# Patient Record
Sex: MALE | Race: WHITE
[De-identification: names, ages, dates, MRNs, and addresses within clinical notes are randomized per-mention and may not be internally consistent; named-entity substitution may affect disease eponyms.]

---

## 2020-07-15 ENCOUNTER — HOSPITAL ENCOUNTER (OUTPATIENT)
Dept: HOSPITAL 84 - D.US | Age: 30
Discharge: HOME | End: 2020-07-15
Attending: FAMILY MEDICINE
Payer: SELF-PAY

## 2020-07-15 DIAGNOSIS — R94.5: Primary | ICD-10-CM

## 2020-07-15 DIAGNOSIS — F10.10: ICD-10-CM

## 2021-05-05 ENCOUNTER — HOSPITAL ENCOUNTER (OUTPATIENT)
Dept: HOSPITAL 84 - D.OPS | Age: 31
Discharge: HOME | End: 2021-05-05
Attending: SURGERY
Payer: COMMERCIAL

## 2021-05-05 VITALS
HEIGHT: 72 IN | HEIGHT: 72 IN | BODY MASS INDEX: 33.59 KG/M2 | WEIGHT: 248 LBS | WEIGHT: 248 LBS | BODY MASS INDEX: 33.59 KG/M2

## 2021-05-05 DIAGNOSIS — F41.0: ICD-10-CM

## 2021-05-05 DIAGNOSIS — K74.60: Primary | ICD-10-CM

## 2021-05-05 DIAGNOSIS — R16.0: ICD-10-CM

## 2021-05-05 DIAGNOSIS — K81.9: ICD-10-CM

## 2021-05-05 LAB
ANION GAP SERPL CALC-SCNC: 13.7 MMOL/L (ref 8–16)
BASOPHILS NFR BLD AUTO: 0.6 % (ref 0–2)
BUN SERPL-MCNC: 16 MG/DL (ref 7–18)
CALCIUM SERPL-MCNC: 9.1 MG/DL (ref 8.5–10.1)
CHLORIDE SERPL-SCNC: 102 MMOL/L (ref 98–107)
CO2 SERPL-SCNC: 27.2 MMOL/L (ref 21–32)
CREAT SERPL-MCNC: 1 MG/DL (ref 0.6–1.3)
EOSINOPHIL NFR BLD: 2.4 % (ref 0–7)
ERYTHROCYTE [DISTWIDTH] IN BLOOD BY AUTOMATED COUNT: 13.1 % (ref 11.5–14.5)
GLUCOSE SERPL-MCNC: 112 MG/DL (ref 74–106)
HCT VFR BLD CALC: 41 % (ref 42–54)
HGB BLD-MCNC: 14.6 G/DL (ref 13.5–17.5)
IMM GRANULOCYTES NFR BLD: 0.2 % (ref 0–5)
LYMPHOCYTES # BLD: 2.06 10X3/UL (ref 1.32–3.57)
LYMPHOCYTES NFR BLD AUTO: 42 % (ref 15–50)
MCH RBC QN AUTO: 32.8 PG (ref 26–34)
MCHC RBC AUTO-ENTMCNC: 35.6 G/DL (ref 31–37)
MCV RBC: 92.1 FL (ref 80–100)
MONOCYTES NFR BLD: 9.2 % (ref 2–11)
NEUTROPHILS # BLD: 2.23 10X3/UL (ref 1.78–5.38)
NEUTROPHILS NFR BLD AUTO: 45.6 % (ref 40–80)
OSMOLALITY SERPL CALC.SUM OF ELEC: 279 MOSM/KG (ref 275–300)
PLATELET # BLD: 168 10X3/UL (ref 130–400)
PMV BLD AUTO: 10.4 FL (ref 7.4–10.4)
POTASSIUM SERPL-SCNC: 3.9 MMOL/L (ref 3.5–5.1)
RBC # BLD AUTO: 4.45 10X6/UL (ref 4.2–6.1)
SODIUM SERPL-SCNC: 139 MMOL/L (ref 136–145)
WBC # BLD AUTO: 4.9 10X3/UL (ref 4.8–10.8)

## 2021-05-05 NOTE — NUR
PT DISCHARGED VIA W/C, ACCOMPANIED BY CAPRI EDWARD, TO [facility] WITH MOTHER
DRIVING. ALL BELONGINGS WITH PT/MOTHER.

## 2021-05-05 NOTE — NUR
DISCHARGE INSTRUCTIONS REVIEWED WITH PT AND MOTHER. BOTH VOICED UNDERSTANDING
AND COPY OF INSTRUCTIONS GIVEN ALONG WITH ORIGINAL RX FOR [place].